# Patient Record
Sex: MALE | URBAN - METROPOLITAN AREA
[De-identification: names, ages, dates, MRNs, and addresses within clinical notes are randomized per-mention and may not be internally consistent; named-entity substitution may affect disease eponyms.]

---

## 2024-06-10 ENCOUNTER — APPOINTMENT (OUTPATIENT)
Dept: URBAN - METROPOLITAN AREA CLINIC 240 | Age: 24
Setting detail: DERMATOLOGY
End: 2024-06-10

## 2024-06-10 DIAGNOSIS — L21.8 OTHER SEBORRHEIC DERMATITIS: ICD-10-CM

## 2024-06-10 DIAGNOSIS — L24 IRRITANT CONTACT DERMATITIS: ICD-10-CM

## 2024-06-10 PROBLEM — L24.5 IRRITANT CONTACT DERMATITIS DUE TO OTHER CHEMICAL PRODUCTS: Status: ACTIVE | Noted: 2024-06-10

## 2024-06-10 PROCEDURE — OTHER PRESCRIPTION: OTHER

## 2024-06-10 PROCEDURE — OTHER COUNSELING: OTHER

## 2024-06-10 PROCEDURE — OTHER PRESCRIPTION MEDICATION MANAGEMENT: OTHER

## 2024-06-10 PROCEDURE — OTHER DIAGNOSIS COMMENT: OTHER

## 2024-06-10 PROCEDURE — 99203 OFFICE O/P NEW LOW 30 MIN: CPT

## 2024-06-10 RX ORDER — KETOCONAZOLE 20 MG/ML
SHAMPOO, SUSPENSION TOPICAL
Qty: 120 | Refills: 11 | Status: ERX | COMMUNITY
Start: 2024-06-10

## 2024-06-10 RX ORDER — TRIAMCINOLONE ACETONIDE 1 MG/G
OINTMENT TOPICAL
Qty: 30 | Refills: 0 | Status: ERX | COMMUNITY
Start: 2024-06-10

## 2024-06-10 ASSESSMENT — LOCATION DETAILED DESCRIPTION DERM
LOCATION DETAILED: RIGHT VENTRAL DISTAL FOREARM
LOCATION DETAILED: POSTERIOR MID-PARIETAL SCALP

## 2024-06-10 ASSESSMENT — LOCATION ZONE DERM
LOCATION ZONE: ARM
LOCATION ZONE: SCALP

## 2024-06-10 ASSESSMENT — LOCATION SIMPLE DESCRIPTION DERM
LOCATION SIMPLE: POSTERIOR SCALP
LOCATION SIMPLE: RIGHT FOREARM

## 2024-06-10 NOTE — HPI: RASH
How Severe Is Your Rash?: moderate
Is This A New Presentation, Or A Follow-Up?: Rash
Additional History: Pt complains of a red and dry rash on his right arm only and believes it could have been caused by a household disinfectant he used recently. Pt denies itching or irritation.

## 2024-06-10 NOTE — HPI: RASH (SEBORRHEIC DERMATITIS)
How Severe Is It?: moderate
Is This A New Presentation, Or A Follow-Up?: Rash
Additional History: Pt using Ketoconazole shampoo 2x/week currently and states it helped at first but he is not getting much relief now. Pt also  reports he was prescribed Ketoconazole cream for the face and beard area and is well controlled.

## 2024-06-10 NOTE — PROCEDURE: COUNSELING
Shampoo Recommendations: Neutrogena T/Sal Shampoo PRN for scale buildup\\n\\nLather into scalp 2x a week. Let sit for 5-minutes then rinse. Follow with regular shampoo.
Detail Level: Zone
Detail Level: Simple
Antihistamine Recommendations: Claritin or Allegra daily (use TID when experiencing hives)\\n\\n*Call 911 if having difficulty breathing, swallowing, if lips or tongue swell or start drooling.
Moisturizer Recommendations: Neutrogena [place] Hand Formula
Cleanser Recommendations: CeraVe or Cetaphil Hydrating Cleansers

## 2024-06-10 NOTE — PROCEDURE: PRESCRIPTION MEDICATION MANAGEMENT
Modify Regimen: Increase use of Ketoconazole shampoo to three times per week, ensure to leave on for 5mins, before rinsing. Educated pt on importance of using as directed for best efficacy. (Pt has old rx from pcp, will send refills today).
Render In Strict Bullet Format?: No
Detail Level: Zone
Plan: Discussed adding a topical steroid for scalp to current regimen. Pt denies itch and need for steroid at this time. Face and beard area well controlled with Ketoconazole cream. RTC if not improving with use.
Initiate Treatment: Triamcinolone ointment twice daily for 2 weeks.
Plan: RTC in 1-2 weeks if not improved with use of rx.